# Patient Record
Sex: FEMALE | Race: ASIAN | URBAN - METROPOLITAN AREA
[De-identification: names, ages, dates, MRNs, and addresses within clinical notes are randomized per-mention and may not be internally consistent; named-entity substitution may affect disease eponyms.]

---

## 2022-07-26 ENCOUNTER — OFFICE VISIT (OUTPATIENT)
Dept: ENDOCRINOLOGY | Age: 37
End: 2022-07-26
Payer: MEDICAID

## 2022-07-26 VITALS
DIASTOLIC BLOOD PRESSURE: 43 MMHG | HEART RATE: 51 BPM | SYSTOLIC BLOOD PRESSURE: 111 MMHG | HEIGHT: 58 IN | WEIGHT: 92 LBS | BODY MASS INDEX: 19.31 KG/M2

## 2022-07-26 DIAGNOSIS — E05.90 SUBCLINICAL HYPERTHYROIDISM: Primary | ICD-10-CM

## 2022-07-26 PROCEDURE — 99204 OFFICE O/P NEW MOD 45 MIN: CPT | Performed by: INTERNAL MEDICINE

## 2022-07-26 RX ORDER — METHIMAZOLE 5 MG/1
TABLET ORAL
Qty: 15 TABLET | Refills: 1 | Status: SHIPPED | OUTPATIENT
Start: 2022-07-26 | End: 2022-09-13 | Stop reason: SDUPTHER

## 2022-07-26 RX ORDER — BISMUTH SUBSALICYLATE 262 MG
1 TABLET,CHEWABLE ORAL DAILY
COMMUNITY

## 2022-07-26 NOTE — PROGRESS NOTES
Chief Complaint   Patient presents with    Thyroid Problem       * New Patient Visit     General:  History of subclinical hyperthyroidism  Had thyroid scan and was normal   No US   No FHx thyroid   See Cards - heart palpitations to have echo in Oct     Thyroid Symptoms  denies change in energy, denies change in weight, denies change in appetite, denies sleep issues, denies hair changes, no skin changes, denies sweats, denies hot/cold intolerance, denies tremor, **has palpitations**, denies change in bowels, no change in menses, denies mood changes    Neck Symptoms  denies dysphagia, denies anterior neck pain, denies neck swelling, notes no nodules      Labs/Studies    5/24/17 TSH 0.44  6/7/17 TSH 0.35, FT4 1.43, FT3 3.2  6/25/18 TSH 0.52  8/20/18 TSH 0.62, FT4 1.5, TT3 109, TRAB < 0.5  10/16/18 TSH 0.41, FT4 1.56, TPO 7, TT3 98   10/23/18 TSH 0.37, FT4 1.55  3/2/20 TSH 0.52  3/8/21 TSH 0.43,FT4 165  3/14/22 TSH 0.43, FT4 1.8H    10/30/18 - Thyroid Uptake and Scan normal radioiodine uptake 7% at 4 hours and 14% at 24 hours. Slightly enlarged bilobed gland. Homogeneous activity. No definitive evidence for underlying thyroid nodule      Past Medical History:   Diagnosis Date    Subclinical hyperthyroidism         Past Surgical History:   Procedure Laterality Date    HX TUBAL LIGATION          Social History     Tobacco Use    Smoking status: Not on file    Smokeless tobacco: Not on file   Substance Use Topics    Alcohol use: Not on file         Blood pressure (!) 111/43, pulse (!) 51, height 4' 9.5\" (1.461 m), weight 92 lb (41.7 kg).      Weight Metrics 7/26/2022   Weight 92 lb   BMI 19.56 kg/m2        EXAM:  - GENERAL: NCAT, Appears well nourished   - EYES: EOMI, non-icteric, no proptosis   - Ear/Nose/Throat: NCAT, no visible inflammation or masses   - CARDIOVASCULAR: no cyanosis, no visible JVD   - RESPIRATORY: respiratory effort normal without any distress or labored breathing   - MUSCULOSKELETAL: Normal ROM of neck and upper extremities observed   - SKIN: No rash on face  - NEUROLOGIC:  No facial asymmetry (Cranial nerve 7 motor function), No gaze palsy   - PSYCHIATRIC: Normal affect, Normal insight and judgement      Assessment/Plan:   1. Subclinical hyperthyroidism  Unknown cause. Autoimmune antibodies have been negative and thyroid uptake and scan showed no increased uptake or toxic nodules. Up to this point had not been treated because TSH greater than 0.1, low risk, and no symptoms. Now there is concern her palpitations are related, even though her TSH levels have remained between 0.3 and 0.6 over the last 5 years  Reasonable to do a trial of very low-dose methimazole (2.5 mg) to see if her palpitations improved. If they do not, the thyroid is not related and does not need to continue the medication. Orders Placed This Encounter    TSH 3RD GENERATION    methIMAzole (TAPAZOLE) 5 mg tablet     Sig: One half (0.5)  tablet once a day     Dispense:  15 Tablet     Refill:  1          Follow-up and Dispositions    Return in about 7 weeks (around 9/13/2022).

## 2022-09-08 LAB — TSH SERPL DL<=0.005 MIU/L-ACNC: 0.81 UIU/ML (ref 0.45–4.5)

## 2022-09-13 ENCOUNTER — OFFICE VISIT (OUTPATIENT)
Dept: ENDOCRINOLOGY | Age: 37
End: 2022-09-13
Payer: MEDICAID

## 2022-09-13 VITALS
HEIGHT: 57 IN | BODY MASS INDEX: 19.91 KG/M2 | DIASTOLIC BLOOD PRESSURE: 63 MMHG | HEART RATE: 109 BPM | SYSTOLIC BLOOD PRESSURE: 106 MMHG

## 2022-09-13 DIAGNOSIS — E05.90 SUBCLINICAL HYPERTHYROIDISM: ICD-10-CM

## 2022-09-13 PROCEDURE — 99214 OFFICE O/P EST MOD 30 MIN: CPT | Performed by: INTERNAL MEDICINE

## 2022-09-13 RX ORDER — GLUCOSAMINE/CHONDR SU A SOD 750-600 MG
TABLET ORAL
COMMUNITY

## 2022-09-13 RX ORDER — METHIMAZOLE 5 MG/1
TABLET ORAL
Qty: 45 TABLET | Refills: 3 | Status: SHIPPED | OUTPATIENT
Start: 2022-09-13

## 2022-09-13 NOTE — PROGRESS NOTES
Chief Complaint   Patient presents with    Thyroid Problem         * New Patient Visit 7/26/2022     Started methimazole  2.5mg for subclinical hyperthyroidism and symptoms  Did not have issues on the methimazole but after 1 month, did not refill and then was having palpitations and feeling nervous tired and irritable. So refilled it and symptoms improved. General:  From initial visit 7/26/22  History of subclinical hyperthyroidism  Had thyroid scan and was normal   No US   No FHx thyroid   See Cards - heart palpitations to have echo in Oct     Thyroid Symptoms  denies change in energy, denies change in weight, denies change in appetite, denies sleep issues, denies hair changes, no skin changes, denies sweats, denies hot/cold intolerance, denies tremor, denies palpitations, denies change in bowels, no change in menses, denies mood changes    Neck Symptoms  denies dysphagia, denies anterior neck pain, denies neck swelling, notes no nodules      Labs/Studies    5/24/17 TSH 0.44  6/7/17 TSH 0.35, FT4 1.43, FT3 3.2  6/25/18 TSH 0.52  8/20/18 TSH 0.62, FT4 1.5, TT3 109, TRAB < 0.5  10/16/18 TSH 0.41, FT4 1.56, TPO 7, TT3 98   10/23/18 TSH 0.37, FT4 1.55  3/2/20 TSH 0.52  3/8/21 TSH 0.43,FT4 165  3/14/22 TSH 0.43, FT4 1.8H    10/30/18 - Thyroid Uptake and Scan normal radioiodine uptake 7% at 4 hours and 14% at 24 hours. Slightly enlarged bilobed gland. Homogeneous activity. No definitive evidence for underlying thyroid nodule    Lab Results   Component Value Date/Time    TSH 0.811 09/07/2022 09:01 AM        Past Medical History:   Diagnosis Date    Subclinical hyperthyroidism            Blood pressure 106/63, pulse (!) 109, height 4' 9\" (1.448 m). Weight Metrics 9/13/2022 7/26/2022   Weight - 92 lb   BMI 19.91 kg/m2 19.56 kg/m2        EXAM:  - not done today     Assessment/Plan:   1. Subclinical hyperthyroidism  Unknown cause.   Autoimmune antibodies have been negative and thyroid uptake and scan showed no increased uptake or toxic nodules. Up to this point had not been treated because TSH greater than 0.1, low risk, and no symptoms. Then there was her palpitations are related, even though her TSH levels have remained between 0.3 and 0.6 over the last 5 years  So did trial of low dose methimazole  (2.5mg) and TSH now 0.8 and notices a difference on the med- less palps, irritability and nervousness  So ok to continue - does not appear to need 5mg dose even though TSH only went up to 0.8    2. Palpitations  Seems to have resolved on the low dose methimazole        Orders Placed This Encounter    TSH 3RD GENERATION     Standing Status:   Future     Standing Expiration Date:   3/13/2024    methIMAzole (TAPAZOLE) 5 mg tablet     Sig: One half (0.5)  tablet once a day     Dispense:  45 Tablet     Refill:  3            Follow-up and Dispositions    Return in about 1 year (around 9/13/2023).

## 2022-09-13 NOTE — LETTER
9/13/2022    Patient: Yves Lacy   YOB: 1985   Date of Visit: 0/13/0172     Katya Pan MD  [address] [place], [place] [address] [facility] [id number]  Via Fax: [phone]    Dear Katya Pan MD,      Thank you for referring Ms. Hardy Shetty to [facility] for evaluation. My notes for this consultation are attached. If you have questions, please do not hesitate to call me. I look forward to following your patient along with you.       Sincerely,    Farooq Sung MD

## 2023-03-01 ENCOUNTER — TRANSCRIBE ORDER (OUTPATIENT)
Dept: SCHEDULING | Age: 38
End: 2023-03-01

## 2023-03-01 DIAGNOSIS — J84.9 ILD (INTERSTITIAL LUNG DISEASE) (HCC): Primary | ICD-10-CM

## 2023-03-01 DIAGNOSIS — J32.9 CHRONIC SINUSITIS: ICD-10-CM

## 2023-03-13 ENCOUNTER — HOSPITAL ENCOUNTER (OUTPATIENT)
Dept: CT IMAGING | Age: 38
Discharge: HOME | End: 2023-03-13
Attending: INTERNAL MEDICINE
Payer: MEDICAID

## 2023-03-13 DIAGNOSIS — J84.9 ILD (INTERSTITIAL LUNG DISEASE) (HCC): ICD-10-CM

## 2023-03-13 DIAGNOSIS — J32.9 CHRONIC SINUSITIS: ICD-10-CM

## 2023-03-13 PROCEDURE — 71250 CT THORAX DX C-: CPT

## 2023-03-13 PROCEDURE — 70486 CT MAXILLOFACIAL W/O DYE: CPT

## 2023-04-21 DIAGNOSIS — E05.90 SUBCLINICAL HYPERTHYROIDISM: Primary | ICD-10-CM

## 2023-06-07 ENCOUNTER — TRANSCRIBE ORDERS (OUTPATIENT)
Facility: HOSPITAL | Age: 38
End: 2023-06-07

## 2023-06-07 DIAGNOSIS — M76.71 PERONEAL TENDINITIS OF RIGHT LOWER EXTREMITY: Primary | ICD-10-CM

## 2023-06-07 DIAGNOSIS — D21.21: ICD-10-CM

## 2023-08-13 DIAGNOSIS — E05.90 SUBCLINICAL HYPERTHYROIDISM: ICD-10-CM

## 2023-08-31 LAB — TSH SERPL DL<=0.005 MIU/L-ACNC: 0.43 UIU/ML (ref 0.45–4.5)

## 2023-09-12 NOTE — PROGRESS NOTES
Chief Complaint   Patient presents with    Thyroid Problem       * Since Last Visit: 9/13/2022     Doing well -had on 2.5mg of methimazole for subclinical hyperthyroidism  Repeat levels good, and at the time symptoms improved    But she stopped the med early 23 b/c felt normal  End of may - had palps, but was from a pain med (had concern thyroid acting up)  TSH is same as before treatment     General:  From initial visit 7/26/22  History of subclinical hyperthyroidism  Had thyroid scan and was normal   No US   No FHx thyroid   See Cards - heart palpitations to have echo in Oct      Thyroid Symptoms  denies change in energy, denies change in weight, denies change in appetite, denies sleep issues, denies hair changes, no skin changes, denies sweats, denies hot/cold intolerance, denies tremor, denies palpitations, denies change in bowels, no change in menses, denies mood changes      Neck Symptoms  denies dysphagia, denies anterior neck pain, denies neck swelling, notes no nodules      Labs/Studies    5/24/17 TSH 0.44  6/7/17 TSH 0.35, FT4 1.43, FT3 3.2  6/25/18 TSH 0.52  8/20/18 TSH 0.62, FT4 1.5, TT3 109, TRAB < 0.5  10/16/18 TSH 0.41, FT4 1.56, TPO 7, TT3 98             10/23/18 TSH 0.37, FT4 1.55  3/2/20 TSH 0.52  3/8/21 TSH 0.43,FT4 165  3/14/22 TSH 0.43, FT4 1.8H     10/30/18 - Thyroid Uptake and Scan normal radioiodine uptake 7% at 4 hours and 14% at 24 hours. Slightly enlarged bilobed gland. Homogeneous activity. No definitive evidence for underlying thyroid nodule      Lab Results   Component Value Date/Time    TSH 0.431 08/30/2023 10:57 AM       Lab Results   Component Value Date/Time    TSH 0.431 08/30/2023 10:57 AM    TSH 0.811 09/07/2022 09:01 AM          Current Outpatient Medications   Medication Sig Dispense Refill    Biotin 2.5 MG CAPS Take by mouth       No current facility-administered medications for this visit.         Past Medical History:   Diagnosis Date    Subclinical hyperthyroidism

## 2023-09-13 ENCOUNTER — OFFICE VISIT (OUTPATIENT)
Age: 38
End: 2023-09-13
Payer: MEDICAID

## 2023-09-13 DIAGNOSIS — E05.90 SUBCLINICAL HYPERTHYROIDISM: Primary | ICD-10-CM

## 2023-09-13 PROCEDURE — 99203 OFFICE O/P NEW LOW 30 MIN: CPT | Performed by: INTERNAL MEDICINE

## 2024-08-13 DIAGNOSIS — E05.90 SUBCLINICAL HYPERTHYROIDISM: ICD-10-CM

## 2025-06-16 ENCOUNTER — TRANSCRIBE ORDERS (OUTPATIENT)
Facility: HOSPITAL | Age: 40
End: 2025-06-16

## 2025-06-16 DIAGNOSIS — D49.2 ODONTOGENIC TUMOR: ICD-10-CM

## 2025-06-16 DIAGNOSIS — M67.471 GANGLION, RIGHT ANKLE AND FOOT: Primary | ICD-10-CM

## 2025-07-21 ENCOUNTER — TRANSCRIBE ORDERS (OUTPATIENT)
Facility: HOSPITAL | Age: 40
End: 2025-07-21

## 2025-07-21 DIAGNOSIS — J47.9 BRONCHIECTASIS WITHOUT COMPLICATION (HCC): Primary | ICD-10-CM
